# Patient Record
Sex: MALE | Race: WHITE | NOT HISPANIC OR LATINO | URBAN - METROPOLITAN AREA
[De-identification: names, ages, dates, MRNs, and addresses within clinical notes are randomized per-mention and may not be internally consistent; named-entity substitution may affect disease eponyms.]

---

## 2019-03-26 ENCOUNTER — EMERGENCY (EMERGENCY)
Facility: HOSPITAL | Age: 29
LOS: 1 days | Discharge: HOME | End: 2019-03-26
Admitting: EMERGENCY MEDICINE
Payer: SELF-PAY

## 2019-03-26 VITALS — RESPIRATION RATE: 18 BRPM | TEMPERATURE: 99 F | OXYGEN SATURATION: 100 % | HEART RATE: 90 BPM

## 2019-03-26 PROCEDURE — 99282 EMERGENCY DEPT VISIT SF MDM: CPT

## 2019-03-26 NOTE — ED ADULT NURSE NOTE - NSIMPLEMENTINTERV_GEN_ALL_ED
Implemented All Universal Safety Interventions:  [place] to call system. Call bell, personal items and telephone within reach. Instruct patient to call for assistance. Room bathroom lighting operational. Non-slip footwear when patient is off stretcher. Physically safe environment: no spills, clutter or unnecessary equipment. Stretcher in lowest position, wheels locked, appropriate side rails in place.

## 2019-03-26 NOTE — ED PROVIDER NOTE - OBJECTIVE STATEMENT
30 y/o male pt presents to the ED c/o "I think I had an anxiety attack, but this one felt different".  Pt states he has a h/o anxiety attacks and has been on SSRIs in the past , "but my doctor recently changed my medication and it was like this hard core anti-depressant and it made me feel super weird when I took it, so I stopped it".  "I felt better after the first day and then this evening, I went to the [place] and then the weird feelings came back again".  "I felt like lightheaded, nauseous, just weird".  Pt reports "I feel much better now, but I just felt so weird earlier, I got scared".  Pt was recently prescribed and stopped Pristiq. Pt's s/s resemble those of adverse effects of Pristiq.  PT instructed to discontinue use of the medication as he said he did and to f/u with his provider. Pt stated he did and "she told me the same thing and to come in to see her".  Pt also instructed to increase his PO water/fluid intake.  Pt stated: "You're probably right, I really didn't drink much today, only had a cup of coffee earlier".  Pt is alert and oriented x 3, denies SI/HI/AH/VH.  Pt is calm and cooperative in BH area and denies any other c/o pain or discomfort.

## 2019-03-26 NOTE — ED ADULT TRIAGE NOTE - CHIEF COMPLAINT QUOTE
Pt. with hx of anxiety reports having his medication switched on Saturday night, stopped after Sunday night due to "not feeling himself and couldn't walk properly." Also states he has had "weird things that he doesn't normally do." Pt. denies visual/auditory hallucinations, suicide or homicide ideation. Spoke with  NP, pt. will go to .

## 2019-03-26 NOTE — ED PROVIDER NOTE - CLINICAL SUMMARY MEDICAL DECISION MAKING FREE TEXT BOX
30 y/o male pt presents to the ED c/o "I think I had an anxiety attack, but this one felt different".  Physical examination completed and unremarkable for any acute issues/problems requiring immediate interventions.  PT cleared for discharge with [facility] information with discharge paperwork, but stated he will f/u with his provider.

## 2021-08-12 PROBLEM — F41.9 ANXIETY DISORDER, UNSPECIFIED: Chronic | Status: ACTIVE | Noted: 2019-03-29

## 2021-09-23 ENCOUNTER — APPOINTMENT (OUTPATIENT)
Dept: OTOLARYNGOLOGY | Facility: CLINIC | Age: 31
End: 2021-09-23

## 2021-09-23 PROBLEM — Z00.00 ENCOUNTER FOR PREVENTIVE HEALTH EXAMINATION: Status: ACTIVE | Noted: 2021-09-23

## 2023-09-14 PROCEDURE — 99203 OFFICE O/P NEW LOW 30 MIN: CPT
